# Patient Record
Sex: FEMALE | Race: WHITE | NOT HISPANIC OR LATINO
[De-identification: names, ages, dates, MRNs, and addresses within clinical notes are randomized per-mention and may not be internally consistent; named-entity substitution may affect disease eponyms.]

---

## 2021-01-01 ENCOUNTER — OFFICE VISIT (OUTPATIENT)
Age: 66
End: 2021-01-01

## 2021-04-01 ENCOUNTER — OFFICE VISIT (OUTPATIENT)
Age: 66
End: 2021-04-01

## 2022-05-03 ENCOUNTER — OFFICE VISIT (OUTPATIENT)
Dept: URBAN - METROPOLITAN AREA CLINIC 9 | Facility: CLINIC | Age: 67
End: 2022-05-03

## 2022-06-21 ENCOUNTER — OFFICE VISIT (OUTPATIENT)
Dept: URBAN - METROPOLITAN AREA SURGERY CENTER 9 | Facility: SURGERY CENTER | Age: 67
End: 2022-06-21

## 2022-07-05 ENCOUNTER — OFFICE VISIT (OUTPATIENT)
Dept: URBAN - METROPOLITAN AREA CLINIC 9 | Facility: CLINIC | Age: 67
End: 2022-07-05

## 2022-07-30 ENCOUNTER — TELEPHONE ENCOUNTER (OUTPATIENT)
Age: 67
End: 2022-07-30

## 2022-07-30 RX ORDER — SODIUM SULFATE, POTASSIUM SULFATE, MAGNESIUM SULFATE 17.5; 3.13; 1.6 G/ML; G/ML; G/ML
1 (ONE) SOLUTION, CONCENTRATE ORAL
Qty: 0 | Refills: 2 | OUTPATIENT
Start: 2022-07-05 | End: 2022-07-06

## 2022-07-31 ENCOUNTER — TELEPHONE ENCOUNTER (OUTPATIENT)
Age: 67
End: 2022-07-31

## 2022-07-31 RX ORDER — PANTOPRAZOLE SODIUM 40 MG/1
1 (ONE) TABLET, DELAYED RELEASE ORAL BID
Qty: 0 | Refills: 3 | Status: ACTIVE | COMMUNITY
Start: 2022-05-03

## 2022-07-31 RX ORDER — PANTOPRAZOLE 20 MG/1
1 (ONE) TABLET, DELAYED RELEASE ORAL BID
Qty: 0 | Refills: 3 | Status: ACTIVE | COMMUNITY
Start: 2022-05-03

## 2022-07-31 RX ORDER — SODIUM SULFATE, POTASSIUM SULFATE, MAGNESIUM SULFATE 17.5; 3.13; 1.6 G/ML; G/ML; G/ML
1 (ONE) SOLUTION, CONCENTRATE ORAL
Qty: 0 | Refills: 2 | Status: ACTIVE | COMMUNITY
Start: 2022-07-05

## 2022-08-15 ENCOUNTER — CLAIMS CREATED FROM THE CLAIM WINDOW (OUTPATIENT)
Dept: URBAN - METROPOLITAN AREA SURGERY CENTER 9 | Facility: SURGERY CENTER | Age: 67
End: 2022-08-15
Payer: MEDICARE

## 2022-08-15 ENCOUNTER — CLAIMS CREATED FROM THE CLAIM WINDOW (OUTPATIENT)
Dept: URBAN - METROPOLITAN AREA CLINIC 8 | Facility: CLINIC | Age: 67
End: 2022-08-15
Payer: MEDICARE

## 2022-08-15 DIAGNOSIS — K63.5 BENIGN COLON POLYP: ICD-10-CM

## 2022-08-15 DIAGNOSIS — Z86.010 ADENOMAS PERSONAL HISTORY OF COLONIC POLYPS: ICD-10-CM

## 2022-08-15 DIAGNOSIS — K64.8 BLEEDING INTERNAL HEMORRHOIDS: ICD-10-CM

## 2022-08-15 DIAGNOSIS — D12.2 ADENOMA OF ASCENDING COLON: ICD-10-CM

## 2022-08-15 DIAGNOSIS — K57.30 ACQUIRED DIVERTICULOSIS OF COLON: ICD-10-CM

## 2022-08-15 PROCEDURE — 88305 TISSUE EXAM BY PATHOLOGIST: CPT | Performed by: INTERNAL MEDICINE

## 2022-08-15 PROCEDURE — 45380 COLONOSCOPY AND BIOPSY: CPT | Performed by: INTERNAL MEDICINE

## 2022-08-15 PROCEDURE — 45380 COLONOSCOPY AND BIOPSY: CPT | Performed by: CLINIC/CENTER

## 2022-08-29 ENCOUNTER — OFFICE VISIT (OUTPATIENT)
Dept: URBAN - METROPOLITAN AREA CLINIC 9 | Facility: CLINIC | Age: 67
End: 2022-08-29

## 2022-08-29 NOTE — HPI-TODAY'S VISIT:
66-year-old female here for follow-up.  We last saw her about 6 weeks ago. At prior visit: 66-year-old female comes in for dysphagia and reflux  Patient has had reflux for several years.  But recently it has been worse.  She is having it every day.  She also is having nausea when she is lying down if she stands up it gets better.  She was on Nexium for years but when it got worse recently they put her on pantoprazole about 3 months ago.  She also try Pepcid and Tums.  This did not really help until about a week ago her symptoms got somewhat better.  She is currently on pantoprazole 40 a day.  Typically is getting daily symptoms.  She did gained about 50 pounds over the last 2 years.  No family history of GI malignancies.  She did have a perforated duodenal ulcer several years ago.  She had a repair for this.  She had a colonoscopy in 2017 and she is not sure when she is due again.  She is also noticed dysphagia to pills only  At prior visit we proceeded with EGD.  This was normal except for concern for 1 cm of Rehman's and a small hiatal hernia.  Biopsies did not show Rehman's just showed mild inflammation.  We also dilated her empirically.  We did request her last colonoscopy records but we did not receive it.  We put on Protonix twice a day for a time to see if this would help  She is here for follow-up today.  On twice a day Protonix she is doing great.  She is pretty much asymptomatic from a GI perspective unless she eats certain food triggers like brownies. At last visit we talked about trying to try trade off the Protonix as her reflux was doing great and go on Pepcid instead.  We also talked about doing a colonoscopy for history of polyps.  We had 2 small polyps that came back as SSA's

## 2022-10-04 ENCOUNTER — OFFICE VISIT (OUTPATIENT)
Dept: URBAN - METROPOLITAN AREA CLINIC 9 | Facility: CLINIC | Age: 67
End: 2022-10-04

## 2022-10-17 ENCOUNTER — OFFICE VISIT (OUTPATIENT)
Dept: URBAN - METROPOLITAN AREA CLINIC 9 | Facility: CLINIC | Age: 67
End: 2022-10-17
Payer: MEDICARE

## 2022-10-17 VITALS
WEIGHT: 195 LBS | SYSTOLIC BLOOD PRESSURE: 130 MMHG | HEIGHT: 63 IN | DIASTOLIC BLOOD PRESSURE: 80 MMHG | BODY MASS INDEX: 34.55 KG/M2

## 2022-10-17 DIAGNOSIS — K21.9 GERD: ICD-10-CM

## 2022-10-17 DIAGNOSIS — R13.10 DYSPHAGIA, UNSPECIFIED TYPE: ICD-10-CM

## 2022-10-17 DIAGNOSIS — R11.0 NAUSEA: ICD-10-CM

## 2022-10-17 DIAGNOSIS — K63.5 COLON POLYPS: ICD-10-CM

## 2022-10-17 PROCEDURE — 99214 OFFICE O/P EST MOD 30 MIN: CPT | Performed by: INTERNAL MEDICINE

## 2022-10-17 RX ORDER — PANTOPRAZOLE SODIUM 40 MG/1
1 TABLET TABLET, DELAYED RELEASE ORAL TWICE A DAY
Qty: 120 TABLET | Refills: 3 | OUTPATIENT
Start: 2022-10-17

## 2022-10-17 RX ORDER — SODIUM SULFATE, POTASSIUM SULFATE, MAGNESIUM SULFATE 17.5; 3.13; 1.6 G/ML; G/ML; G/ML
1 (ONE) SOLUTION, CONCENTRATE ORAL
Qty: 0 | Refills: 2 | Status: ON HOLD | COMMUNITY
Start: 2022-07-05

## 2022-10-17 RX ORDER — PANTOPRAZOLE SODIUM 40 MG/1
1 (ONE) TABLET, DELAYED RELEASE ORAL BID
Qty: 0 | Refills: 3 | Status: ACTIVE | COMMUNITY
Start: 2022-05-03

## 2022-10-17 RX ORDER — ESCITALOPRAM OXALATE 10 MG/1
1 TABLET TABLET, FILM COATED ORAL ONCE A DAY
Status: ACTIVE | COMMUNITY

## 2022-10-17 RX ORDER — PANTOPRAZOLE 20 MG/1
1 (ONE) TABLET, DELAYED RELEASE ORAL BID
Qty: 0 | Refills: 3 | Status: ON HOLD | COMMUNITY
Start: 2022-05-03

## 2022-10-17 NOTE — HPI-TODAY'S VISIT:
66-year-old female here for follow-up.  We last saw her about 6 weeks ago. At prior visit: 66-year-old female comes in for dysphagia and reflux  Patient has had reflux for several years.  But recently it has been worse.  She is having it every day.  She also is having nausea when she is lying down if she stands up it gets better.  She was on Nexium for years but when it got worse recently they put her on pantoprazole about 3 months ago.  She also try Pepcid and Tums.  This did not really help until about a week ago her symptoms got somewhat better.  She is currently on pantoprazole 40 a day.  Typically is getting daily symptoms.  She did gained about 50 pounds over the last 2 years.  No family history of GI malignancies.  She did have a perforated duodenal ulcer several years ago.  She had a repair for this.  She had a colonoscopy in 2017 and she is not sure when she is due again.  She is also noticed dysphagia to pills only  At prior visit we proceeded with EGD.  This was normal except for concern for 1 cm of Rehman's and a small hiatal hernia.  Biopsies did not show Rehman's just showed mild inflammation.  We also dilated her empirically.  We did request her last colonoscopy records but we did not receive it.  We put on Protonix twice a day for a time to see if this would help  She is here for follow-up today.  On twice a day Protonix she is doing great.  She is pretty much asymptomatic from a GI perspective unless she eats certain food triggers like brownies. At last visit we talked about trying to try trade off the Protonix as her reflux was doing great and go on Pepcid instead.  We also talked about doing a colonoscopy for history of polyps.  We had 2 small polyps that came back as SSA's  Patient was doing well until recently she had resurgence of acid reflux and also regurgitation when she bends over.  She is on Protonix once a day and Pepcid at night.  No weight changes.  She is under a lot of stress

## 2022-11-14 ENCOUNTER — OFFICE VISIT (OUTPATIENT)
Dept: URBAN - METROPOLITAN AREA CLINIC 9 | Facility: CLINIC | Age: 67
End: 2022-11-14
Payer: MEDICARE

## 2022-11-14 VITALS
DIASTOLIC BLOOD PRESSURE: 76 MMHG | HEIGHT: 63 IN | BODY MASS INDEX: 34.73 KG/M2 | SYSTOLIC BLOOD PRESSURE: 120 MMHG | WEIGHT: 196 LBS

## 2022-11-14 DIAGNOSIS — K63.5 COLON POLYPS: ICD-10-CM

## 2022-11-14 DIAGNOSIS — R11.0 NAUSEA: ICD-10-CM

## 2022-11-14 DIAGNOSIS — R13.10 DYSPHAGIA, UNSPECIFIED TYPE: ICD-10-CM

## 2022-11-14 DIAGNOSIS — K21.9 GERD: ICD-10-CM

## 2022-11-14 PROBLEM — 235595009 GASTROESOPHAGEAL REFLUX DISEASE: Status: ACTIVE | Noted: 2022-08-24

## 2022-11-14 PROCEDURE — 99214 OFFICE O/P EST MOD 30 MIN: CPT | Performed by: INTERNAL MEDICINE

## 2022-11-14 RX ORDER — PANTOPRAZOLE 20 MG/1
1 (ONE) TABLET, DELAYED RELEASE ORAL BID
Qty: 0 | Refills: 3 | Status: ON HOLD | COMMUNITY
Start: 2022-05-03

## 2022-11-14 RX ORDER — PANTOPRAZOLE SODIUM 40 MG/1
1 TABLET TABLET, DELAYED RELEASE ORAL TWICE A DAY
Qty: 120 TABLET | Refills: 3 | OUTPATIENT

## 2022-11-14 RX ORDER — PANTOPRAZOLE SODIUM 40 MG/1
1 (ONE) TABLET, DELAYED RELEASE ORAL BID
Qty: 0 | Refills: 3 | Status: ACTIVE | COMMUNITY
Start: 2022-05-03

## 2022-11-14 RX ORDER — PANTOPRAZOLE SODIUM 40 MG/1
1 TABLET TABLET, DELAYED RELEASE ORAL TWICE A DAY
Qty: 120 TABLET | Refills: 3 | Status: ACTIVE | COMMUNITY
Start: 2022-10-17

## 2022-11-14 RX ORDER — ESCITALOPRAM OXALATE 10 MG/1
1 TABLET TABLET, FILM COATED ORAL ONCE A DAY
Status: ACTIVE | COMMUNITY

## 2022-11-14 RX ORDER — SODIUM SULFATE, POTASSIUM SULFATE, MAGNESIUM SULFATE 17.5; 3.13; 1.6 G/ML; G/ML; G/ML
1 (ONE) SOLUTION, CONCENTRATE ORAL
Qty: 0 | Refills: 2 | Status: ON HOLD | COMMUNITY
Start: 2022-07-05

## 2023-04-25 ENCOUNTER — WEB ENCOUNTER (OUTPATIENT)
Dept: URBAN - METROPOLITAN AREA CLINIC 9 | Facility: CLINIC | Age: 68
End: 2023-04-25

## 2023-04-25 ENCOUNTER — DASHBOARD ENCOUNTERS (OUTPATIENT)
Age: 68
End: 2023-04-25

## 2023-04-25 ENCOUNTER — OFFICE VISIT (OUTPATIENT)
Dept: URBAN - METROPOLITAN AREA CLINIC 9 | Facility: CLINIC | Age: 68
End: 2023-04-25
Payer: MEDICARE

## 2023-04-25 VITALS
WEIGHT: 198 LBS | BODY MASS INDEX: 35.08 KG/M2 | HEIGHT: 63 IN | SYSTOLIC BLOOD PRESSURE: 130 MMHG | DIASTOLIC BLOOD PRESSURE: 80 MMHG

## 2023-04-25 DIAGNOSIS — K63.5 COLON POLYPS: ICD-10-CM

## 2023-04-25 DIAGNOSIS — R11.0 NAUSEA: ICD-10-CM

## 2023-04-25 DIAGNOSIS — K21.9 GERD: ICD-10-CM

## 2023-04-25 DIAGNOSIS — R13.10 DYSPHAGIA, UNSPECIFIED TYPE: ICD-10-CM

## 2023-04-25 PROCEDURE — 99214 OFFICE O/P EST MOD 30 MIN: CPT | Performed by: INTERNAL MEDICINE

## 2023-04-25 RX ORDER — PANTOPRAZOLE 20 MG/1
1 (ONE) TABLET, DELAYED RELEASE ORAL BID
Qty: 0 | Refills: 3 | Status: ON HOLD | COMMUNITY
Start: 2022-05-03

## 2023-04-25 RX ORDER — SODIUM SULFATE, POTASSIUM SULFATE, MAGNESIUM SULFATE 17.5; 3.13; 1.6 G/ML; G/ML; G/ML
1 (ONE) SOLUTION, CONCENTRATE ORAL
Qty: 0 | Refills: 2 | Status: ON HOLD | COMMUNITY
Start: 2022-07-05

## 2023-04-25 RX ORDER — PANTOPRAZOLE SODIUM 40 MG/1
1 TABLET TABLET, DELAYED RELEASE ORAL TWICE A DAY
Qty: 120 TABLET | Refills: 3 | Status: ACTIVE | COMMUNITY

## 2023-04-25 RX ORDER — PANTOPRAZOLE SODIUM 40 MG/1
1 (ONE) TABLET, DELAYED RELEASE ORAL BID
Qty: 0 | Refills: 3 | Status: DISCONTINUED | COMMUNITY
Start: 2022-05-03

## 2023-04-25 RX ORDER — ESCITALOPRAM OXALATE 10 MG/1
1 TABLET TABLET, FILM COATED ORAL ONCE A DAY
Status: DISCONTINUED | COMMUNITY

## 2023-04-25 NOTE — HPI-TODAY'S VISIT:
66-year-old female here for follow-up.  We last saw her about 6 weeks ago. At prior visit: 66-year-old female comes in for dysphagia and reflux  Patient has had reflux for several years.  But recently it has been worse.  She is having it every day.  She also is having nausea when she is lying down if she stands up it gets better.  She was on Nexium for years but when it got worse recently they put her on pantoprazole about 3 months ago.  She also try Pepcid and Tums.  This did not really help until about a week ago her symptoms got somewhat better.  She is currently on pantoprazole 40 a day.  Typically is getting daily symptoms.  She did gained about 50 pounds over the last 2 years.  No family history of GI malignancies.  She did have a perforated duodenal ulcer several years ago.  She had a repair for this.  She had a colonoscopy in 2017 and she is not sure when she is due again.  She is also noticed dysphagia to pills only  At prior visit we proceeded with EGD.  This was normal except for concern for 1 cm of Rehman's and a small hiatal hernia.  Biopsies did not show Rehman's just showed mild inflammation.  We also dilated her empirically.  We did request her last colonoscopy records but we did not receive it.  We put on Protonix twice a day for a time to see if this would help  She is here for follow-up today.  On twice a day Protonix she is doing great.  She is pretty much asymptomatic from a GI perspective unless she eats certain food triggers like brownies. At last visit we talked about trying to try trade off the Protonix as her reflux was doing great and go on Pepcid instead.  We also talked about doing a colonoscopy for history of polyps.  We had 2 small polyps that came back as SSA's  Patient was doing well until recently she had resurgence of acid reflux and also regurgitation when she bends over.  She is on Protonix once a day and Pepcid at night.  No weight changes.  She is under a lot of stress at last visit she has had a reoccurrence of her symptoms we put on Protonix 40 twice a day for a month  She is here for follow-up today.  She has been on the twice a day Protonix.  She has tried to go down on once a day and has not had the best luck with that she has a resurgence of her symptoms At last visit she was having a little bit of worsening of her reflux.  We felt it might be stress related.  We were going to try and do Pepcid twice a day and see if she is able to come down from her twice a day Protonix.  She is here for follow-up today.  She has changed her diet and lifestyle and has been eating clean and has lost 10 pounds.  She is currently on Protonix in the morning and Pepcid at night.  She was doing great but on vacation had 1 episode of pretty significant reflux other than this she is doing well